# Patient Record
Sex: FEMALE | Race: WHITE
[De-identification: names, ages, dates, MRNs, and addresses within clinical notes are randomized per-mention and may not be internally consistent; named-entity substitution may affect disease eponyms.]

---

## 2019-07-30 ENCOUNTER — HOSPITAL ENCOUNTER (OUTPATIENT)
Dept: HOSPITAL 95 - ORSCSDS | Age: 49
Discharge: HOME | End: 2019-07-30
Attending: INTERNAL MEDICINE
Payer: COMMERCIAL

## 2019-07-30 VITALS — HEIGHT: 60.98 IN | WEIGHT: 158.07 LBS | BODY MASS INDEX: 29.84 KG/M2

## 2019-07-30 DIAGNOSIS — R73.03: ICD-10-CM

## 2019-07-30 DIAGNOSIS — K57.30: ICD-10-CM

## 2019-07-30 DIAGNOSIS — R19.4: Primary | ICD-10-CM

## 2019-07-30 DIAGNOSIS — K21.9: ICD-10-CM

## 2019-07-30 DIAGNOSIS — Z86.010: ICD-10-CM

## 2019-07-30 DIAGNOSIS — D12.0: ICD-10-CM

## 2019-07-30 DIAGNOSIS — E66.9: ICD-10-CM

## 2019-07-30 DIAGNOSIS — K64.8: ICD-10-CM

## 2019-07-30 DIAGNOSIS — Z79.899: ICD-10-CM

## 2019-07-30 DIAGNOSIS — F32.9: ICD-10-CM

## 2019-07-30 DIAGNOSIS — K62.1: ICD-10-CM

## 2019-07-30 PROCEDURE — 0DBH8ZX EXCISION OF CECUM, VIA NATURAL OR ARTIFICIAL OPENING ENDOSCOPIC, DIAGNOSTIC: ICD-10-PCS | Performed by: INTERNAL MEDICINE

## 2019-07-30 PROCEDURE — 0DBP8ZX EXCISION OF RECTUM, VIA NATURAL OR ARTIFICIAL OPENING ENDOSCOPIC, DIAGNOSTIC: ICD-10-PCS | Performed by: INTERNAL MEDICINE

## 2021-05-17 NOTE — NUR
PT TO SDS FROM ROOM 230.
REPORTS NPO SINCE LAST NOC.
History, Chart, Medications and Allergies reviewed before start of
procedure.
Lungs clear T/O to Auscultation.
+ VOID, UNDERWEAR AND PERIPAD IN PLACE D/T BLEEDING.

## 2021-05-17 NOTE — NUR
DISCHARGE
PT DISCHARGED HOME FROM UNIT AT APROX 1900. PT VOIDING, SMALL AMT VAGINAL
BLEEDING, TOLERATING PO AND NO REPORTED PAIN. PT GIVEN WRITTEN AND VERBAL
DISCHARGE INSTRUCTIONS AND VERBALIZED UNDERSTANDING OF THESE INSTRUCTIONS. IV
REMOVED, PT TOLERATED WELL. WHEELCHAIR TO CAR.

## 2021-05-17 NOTE — NUR
PT ARRIVED TO UNIT AT APROX 0830 FROM ER. DENIES PAIN, REPORTS SOME NAUSEA.
PLAN TO ADMINISTER 1 UNIT PRBC.